# Patient Record
Sex: FEMALE | Race: WHITE
[De-identification: names, ages, dates, MRNs, and addresses within clinical notes are randomized per-mention and may not be internally consistent; named-entity substitution may affect disease eponyms.]

---

## 2018-03-18 ENCOUNTER — HOSPITAL ENCOUNTER (EMERGENCY)
Dept: HOSPITAL 25 - ED | Age: 33
Discharge: HOME | End: 2018-03-18
Payer: COMMERCIAL

## 2018-03-18 VITALS — DIASTOLIC BLOOD PRESSURE: 79 MMHG | SYSTOLIC BLOOD PRESSURE: 111 MMHG

## 2018-03-18 DIAGNOSIS — Z87.891: ICD-10-CM

## 2018-03-18 DIAGNOSIS — H60.91: Primary | ICD-10-CM

## 2018-03-18 PROCEDURE — 99281 EMR DPT VST MAYX REQ PHY/QHP: CPT

## 2018-03-18 NOTE — ED
Throat Pain/Nasal Congestion





- HPI Summary


HPI Summary: 





33 female presents to ED with complaints of right ear pain that began last night

, 3/17/18. States she has had some clear discharge coming from right ear. 

Recently had a cold but has since improved. Denies any OTC medications. Denies 

fever/chills. No other complaints. No PMHx. Does have some decreased hearing in 

the right ear, like it is "full". Has not been swimming lately.





- History of Current Complaint


Chief Complaint: EDEarPain


Time Seen by Provider: 03/18/18 10:37


Hx Obtained From: Patient


Onset/Duration: Sudden Onset, Lasting Days - 1


Severity: Moderate


Cough: None





- Allergies/Home Medications


Allergies/Adverse Reactions: 


 Allergies











Allergy/AdvReac Type Severity Reaction Status Date / Time


 


No Known Allergies Allergy   Verified 03/18/18 10:34














PMH/Surg Hx/FS Hx/Imm Hx


Endocrine/Hematology History: 


   Denies: Hx Anticoagulant Therapy


Cardiovascular History: 


   Denies: Hx Hypertension


Respiratory History: 


   Denies: Hx Asthma





- Surgical History


Surgery Procedure, Year, and Place: n/a





- Immunization History


Immunizations Up to Date: Yes


Infectious Disease History: No


Infectious Disease History: 


   Denies: Traveled Outside the [place] in Last 30 Days





- Family History


Known Family History: Positive: None





- Social History


Alcohol Use: None


Substance Use Type: Reports: None


Smoking Status (MU): Former Smoker





Review of Systems


Constitutional: Negative


Positive: Sore Throat - resolved, Ear Ache, Nasal Discharge


Cardiovascular: Negative


Respiratory: Negative


Positive: Cough - resolved


Neurological: Negative


All Other Systems Reviewed And Are Negative: Yes





Physical Exam


Triage Information Reviewed: Yes


Vital Signs On Initial Exam: 


 Initial Vitals











Temp Pulse Resp BP Pulse Ox


 


 97.1 F   87   14   133/76   97 


 


 03/18/18 10:34  03/18/18 10:34  03/18/18 10:34  03/18/18 10:34  03/18/18 10:34











Vital Signs Reviewed: Yes


Appearance: Positive: Well-Appearing, No Pain Distress, Well-Nourished


Skin: Positive: Warm, Skin Color Reflects Adequate Perfusion, Dry.  Negative: 

Cold, Numb, Cyanosis @, Pale, Erythema @


Head/Face: Positive: Normal Head/Face Inspection


Eyes: Positive: Normal, EOMI, ALEJANDRO, Conjunctiva Clear


ENT: Positive: Pharynx normal, Nasal congestion, TM red - right- although only 

able to visualize portion of TM due to obstruction from all discharge and 

erythema in EAC of right ear. left ear normal.  Negative: Pharyngeal erythema, 

Nasal drainage, Tonsillar swelling, Tonsillar exudate


Neck: Positive: Supple, Nontender, No Lymphadenopathy


Respiratory/Lung Sounds: Positive: Clear to Auscultation, Breath Sounds 

Present.  Negative: Rales, Rhonchi, Wheezes


Cardiovascular: Positive: Normal, RRR, Pulses are Symmetrical in both Upper and 

Lower Extremities.  Negative: Murmur, Rub


Musculoskeletal: Positive: Normal, Strength/ROM Intact


Neurological: Positive: Normal, Sensory/Motor Intact, Alert, Oriented to Person 

Place, Time





Diagnostics





- Vital Signs


 Vital Signs











  Temp Pulse Resp BP Pulse Ox


 


 03/18/18 10:34  97.1 F  87  14  133/76  97














- Laboratory


Lab Statement: Any lab studies that have been ordered have been reviewed, and 

results considered in the medical decision making process.





EENT Course/Dx





- Course


Course Of Treatment: appears to be suffering from right otitis externa. will 

treat with ciprodex. although unable to visualize entire TM will send over oral 

AB if symptoms do not improve or worsen. patient does not have PCP. follow up. 

aware of worsening signs and symptoms to watch out for. ibuprofen, warm 

compresses, avoid swimming and do not submerge in water, do not stick anything 

in ear.





- Differential Diagnoses


Differential Diagnoses: Otitis Externa, Otitis Media





- Diagnoses


Provider Diagnoses: 


 Otitis externa of right ear








Discharge





- Discharge Plan


Condition: Good


Disposition: HOME


Prescriptions: 


Amoxicillin PO (*) [Amoxicillin 500 MG CAP*] 500 mg PO Q12H #20 cap


Ciproflox/Dexameth OTIC.SUSP* [Ciprodex OTIC.SUSP*] 4 drop RIGHT EAR BID #1 btl


Patient Education Materials:  Otitis Externa (ED)


Referrals: 


[facility] [place] PHYSICIAN REFERRAL [Outside]


Additional Instructions: 


Take prescribed ear drop as directed for the next 7 days.


Do not put anything else into the ear, do not submerge in water, avoid swimming.


Ibuprofen for pain and inflammation. Warm compresses on outside of ear to 

soothe discomfort, dry ear well. 


If symptoms improve in the next 3-4 days you do not have to take oral 

antibiotic (amoxicillin) however it is not improving or worsening please then 

take oral antibiotic at this time, ONLY if needed.


Any new or worsening symptoms please seek medical attention.


Follow up with PCP for recheck in 7 days, sooner if needed.